# Patient Record
Sex: MALE | Race: BLACK OR AFRICAN AMERICAN | HISPANIC OR LATINO | Employment: FULL TIME | URBAN - NONMETROPOLITAN AREA
[De-identification: names, ages, dates, MRNs, and addresses within clinical notes are randomized per-mention and may not be internally consistent; named-entity substitution may affect disease eponyms.]

---

## 2023-03-05 ENCOUNTER — OFFICE VISIT (OUTPATIENT)
Dept: URGENT CARE | Facility: CLINIC | Age: 28
End: 2023-03-05

## 2023-03-05 DIAGNOSIS — B37.0 ORAL THRUSH: Primary | ICD-10-CM

## 2023-03-05 NOTE — PROGRESS NOTES
[facility]        NAME: Marcos Mckay is a 33 y o  male  : 1995    MRN: [id number]  DATE: 2023  TIME: 1:47 PM    Assessment and Plan   Oral thrush [B37 0]  1  Oral thrush  nystatin (MYCOSTATIN) 500,000 units/5 mL suspension        Patient's history and physical exam is consistent with oral thrush because unknown at this time recommend patient follows up with PCP or get lab work for diabetes STDs immune disorders  We will order nystatin and give instructions on how to treat at home      Patient Instructions       Follow up with PCP in 3-5 days  Proceed to  ER if symptoms worsen  Chief Complaint     Chief Complaint   Patient presents with   • Oral Pain     Initial onset 18 months ago did not try any otc meds for this         History of Present Illness       HPI-complains of reoccurring burning mouth swollen gums and white plaque like material on his tongue roof of his mouth and gums  Has used over-the-counter mouthwashes with short-term relief  Patient states has had negative STD testing and is unknown for diabetes or immune disorder    Review of Systems   Review of Systems   Constitutional: Negative for chills and fever  HENT: Positive for mouth sores  Negative for ear pain and sore throat  Eyes: Negative for pain and visual disturbance  Respiratory: Negative for cough and shortness of breath  Cardiovascular: Negative for chest pain and palpitations  Gastrointestinal: Negative for abdominal pain and vomiting  Genitourinary: Negative for dysuria and hematuria  Musculoskeletal: Negative for arthralgias and back pain  Skin: Negative for color change and rash  Neurological: Negative for seizures and syncope  All other systems reviewed and are negative          Current Medications       Current Outpatient Medications:   •  nystatin (MYCOSTATIN) 500,000 units/5 mL suspension, Use of 5 mL to swish the liquid throughout the mouth as long as you can before spitting out 4 times daily, Disp: 200 mL, Rfl: 3    Current Allergies     Allergies as of 03/05/2023   • (No Known Allergies)            The following portions of the patient's history were reviewed and updated as appropriate: allergies, current medications, past family history, past medical history, past social history, past surgical history and problem list      History reviewed  No pertinent past medical history  History reviewed  No pertinent surgical history  No family history on file  Medications have been verified  Objective   There were no vitals taken for this visit  Physical Exam     Physical Exam  Constitutional:       Appearance: Normal appearance  HENT:      Head: Normocephalic  Right Ear: Tympanic membrane normal       Left Ear: Tympanic membrane normal       Mouth/Throat:      Mouth: Mucous membranes are moist  Oral lesions present  Comments: White thick plaques on tongue and gums and roof of mouth  Eyes:      Extraocular Movements: Extraocular movements intact  Pupils: Pupils are equal, round, and reactive to light  Cardiovascular:      Rate and Rhythm: Normal rate and regular rhythm  Pulses: Normal pulses  Heart sounds: Normal heart sounds  Pulmonary:      Effort: Pulmonary effort is normal       Breath sounds: Normal breath sounds  Abdominal:      General: Bowel sounds are normal    Musculoskeletal:         General: Normal range of motion  Skin:     General: Skin is warm  Neurological:      General: No focal deficit present  Mental Status: He is alert     Psychiatric:         Mood and Affect: Mood normal

## 2023-03-05 NOTE — PATIENT INSTRUCTIONS
Recommend lab work from your PCP consisting of CBC blood work [employer]  metabolic panel /diabetic panel/ redo STD panel